# Patient Record
(demographics unavailable — no encounter records)

---

## 2024-10-10 NOTE — DISCUSSION/SUMMARY
[FreeTextEntry1] : continue fenofibrate 145 continue rosuvastatin 20  metabolic syndrome in 2022  continue coreg 6.25 mg po q12 cont valsartan 160  on sildenafil  echo f/u in 6 months bloodwork

## 2024-10-10 NOTE — HISTORY OF PRESENT ILLNESS
[FreeTextEntry1] : Pt with HTN, HLD, angina in the past. DILATED AORTA 4.3 cm, metabolic syndrome, [facility], ED, Carotid dz    23: HDL: 38 LDL: 46 T from 208 Pt denies cp, sob, dizziness, no swelling. Pt educated on diet and exercise for cholesterol.   23: HDL: 43, T, LDL: 46   pt TG now improved vs past on fenofibrate 145.  pt with dilated aorta and did not get echo done.  Patient denies cp, sob, lightheadedness, dizziness, or syncope. Patient not very active right now due to heat. In the cooler weather patient walks 45 minutes to one hour mostly every day. Patient going to [place] in September until April.   24:  pt was in [place] for winter in [place] and walking 3 miles/ day when warm pt does not walk here in  with issues. pt goes to gym and walks on treadmill with weights, pt denies without any anginal episodes.  echo: LVEF: 60%, DD1, e' sept: 0.07 m/s E/e': 11, LVOT VTI: 25.8 cm, borderline LAE, dilated asc ao: 3.8 cm but nwv  3/24: LDL 43 HDL 41 GFR: 69  A1c: 5.3   10/10/24: 24: HDL: 40, T, LDL: 50, BUN/CR: 27/1.02, GFR: 77, A1C: 5.7 worsening, BNP: 86  : Carotid: less than 50% b/l ica, mild to moderate atherosclerosis  pt poor diet in summer, pt denies cp or sob.  pt walking but not exercising.

## 2024-10-10 NOTE — HISTORY OF PRESENT ILLNESS
[FreeTextEntry1] : Pt with HTN, HLD, angina in the past. DILATED AORTA 4.3 cm, metabolic syndrome, [facility], ED, Carotid dz    23: HDL: 38 LDL: 46 T from 208 Pt denies cp, sob, dizziness, no swelling. Pt educated on diet and exercise for cholesterol.   23: HDL: 43, T, LDL: 46   pt TG now improved vs past on fenofibrate 145.  pt with dilated aorta and did not get echo done.  Patient denies cp, sob, lightheadedness, dizziness, or syncope. Patient not very active right now due to heat. In the cooler weather patient walks 45 minutes to one hour mostly every day. Patient going to [place] in September until April.   24:  pt was in [place] for winter in [place] and walking 3 miles/ day when warm pt does not walk here in  with issues. pt goes to gym and walks on treadmill with weights, pt denies without any anginal episodes.  echo: LVEF: 60%, DD1, e' sept: 0.07 m/s E/e': 11, LVOT VTI: 25.8 cm, borderline LAE, dilated asc ao: 3.8 cm but nwv  3/24: LDL 43 HDL 41 GFR: 69  A1c: 5.3   10/10/24: 24: HDL: 40, T, LDL: 50, BUN/CR: 27/1.02, GFR: 77, A1C: 5.7 worsening, BNP: 86  : Carotid: less than 50% b/l ica, mild to moderate atherosclerosis  pt poor diet in summer, pt denies cp or sob.  pt walking but not exercising.

## 2025-04-10 NOTE — DISCUSSION/SUMMARY
[FreeTextEntry1] : continue fenofibrate 145 continue rosuvastatin 20  metabolic syndrome in 2022  continue coreg 6.25 mg po q12 increase valsartan from 160 to 320 on sildenafil  Patient going to [place] will follow up when he returns  SOB: CCTA - no metoprolol patient bradycardic  bloodwork/echo/carotid/6 months  [EKG obtained to assist in diagnosis and management of assessed problem(s)] : EKG obtained to assist in diagnosis and management of assessed problem(s)

## 2025-04-10 NOTE — HISTORY OF PRESENT ILLNESS
[FreeTextEntry1] : Pt with HTN, HLD, angina in the past. DILATED AORTA 4.3 cm, metabolic syndrome, [facility], ED, Carotid dz    23: HDL: 38 LDL: 46 T from 208 Pt denies cp, sob, dizziness, no swelling. Pt educated on diet and exercise for cholesterol.   23: HDL: 43, T, LDL: 46   pt TG now improved vs past on fenofibrate 145.  pt with dilated aorta and did not get echo done.  Patient denies cp, sob, lightheadedness, dizziness, or syncope. Patient not very active right now due to heat. In the cooler weather patient walks 45 minutes to one hour mostly every day. Patient going to [place] in September until April.   24:  pt was in [place] for winter in [place] and walking 3 miles/ day when warm pt does not walk here in  with issues. pt goes to gym and walks on treadmill with weights, pt denies without any anginal episodes.  echo: LVEF: 60%, DD1, e' sept: 0.07 m/s E/e': 11, LVOT VTI: 25.8 cm, borderline LAE, dilated asc ao: 3.8 cm but nwv  3/24: LDL 43 HDL 41 GFR: 69  A1c: 5.3   10/10/24: 24: HDL: 40, T, LDL: 50, BUN/CR: 27/1.02, GFR: 77, A1C: 5.7 worsening, BNP: 86  : Carotid: less than 50% b/l ica, mild to moderate atherosclerosis  pt poor diet in summer, pt denies cp or sob.  pt walking but not exercising.   4/10/25: 25: HDL: 48, T, LDL: 66, BNP: 46, A1C: 5.5 improved  Patient states BP at home is usually in 140s  Patient having chest pain about once a month and lasting a few seconds. Patient feels tired when going up the stairs. The patient denies CP, bleeding, SOB at rest, PND, abdominal discomfort, LH/dizziness, BLE edema, palpitations, and syncope.

## 2025-04-10 NOTE — DISCUSSION/SUMMARY
[FreeTextEntry1] : continue fenofibrate 145 continue rosuvastatin 20  metabolic syndrome in 2022  continue coreg 6.25 mg po q12 increase valsartan from 160 to 320 on sildenafil  Patient going to [place] will follow up when he returns  SOB: CCTA - no metoprolol patient bradycardic  bloodwork/echo/carotid/6 months  [EKG obtained to assist in diagnosis and management of assessed problem(s)] : EKG obtained to assist in diagnosis and management of assessed problem(s)